# Patient Record
Sex: FEMALE | Race: WHITE | NOT HISPANIC OR LATINO | Employment: STUDENT | URBAN - NONMETROPOLITAN AREA
[De-identification: names, ages, dates, MRNs, and addresses within clinical notes are randomized per-mention and may not be internally consistent; named-entity substitution may affect disease eponyms.]

---
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
## 2025-02-25 ENCOUNTER — OFFICE VISIT (OUTPATIENT)
Age: 20
End: 2025-02-25
Payer: COMMERCIAL

## 2025-02-25 VITALS
TEMPERATURE: 97.6 F | SYSTOLIC BLOOD PRESSURE: 128 MMHG | HEART RATE: 100 BPM | DIASTOLIC BLOOD PRESSURE: 80 MMHG | OXYGEN SATURATION: 98 % | HEIGHT: 67 IN | WEIGHT: 143 LBS | BODY MASS INDEX: 22.44 KG/M2

## 2025-02-25 DIAGNOSIS — N94.10 DYSPAREUNIA IN FEMALE: ICD-10-CM

## 2025-02-25 DIAGNOSIS — F41.9 ANXIETY: Primary | ICD-10-CM

## 2025-02-25 DIAGNOSIS — Z30.41 ENCOUNTER FOR SURVEILLANCE OF CONTRACEPTIVE PILLS: ICD-10-CM

## 2025-02-25 DIAGNOSIS — R00.2 PALPITATION: ICD-10-CM

## 2025-02-25 DIAGNOSIS — F32.4 MAJOR DEPRESSIVE DISORDER WITH SINGLE EPISODE, IN PARTIAL REMISSION: ICD-10-CM

## 2025-02-25 DIAGNOSIS — R10.2 VAGINAL PAIN: ICD-10-CM

## 2025-02-25 DIAGNOSIS — K58.2 IRRITABLE BOWEL SYNDROME WITH BOTH CONSTIPATION AND DIARRHEA: ICD-10-CM

## 2025-02-25 DIAGNOSIS — R00.0 TACHYCARDIA: ICD-10-CM

## 2025-02-25 PROCEDURE — 99214 OFFICE O/P EST MOD 30 MIN: CPT | Performed by: NURSE PRACTITIONER

## 2025-02-25 RX ORDER — SERTRALINE HYDROCHLORIDE 25 MG/1
25 TABLET, FILM COATED ORAL DAILY
Qty: 30 TABLET | Refills: 3 | Status: SHIPPED | OUTPATIENT
Start: 2025-02-25

## 2025-02-25 RX ORDER — NORGESTIMATE AND ETHINYL ESTRADIOL 0.25-0.035
1 KIT ORAL DAILY
Qty: 28 TABLET | Refills: 5 | Status: SHIPPED | OUTPATIENT
Start: 2025-02-25

## 2025-02-25 RX ORDER — AMITRIPTYLINE HYDROCHLORIDE 10 MG/1
10 TABLET ORAL NIGHTLY
Qty: 30 TABLET | Refills: 5 | Status: SHIPPED | OUTPATIENT
Start: 2025-02-25

## 2025-02-25 SDOH — ECONOMIC STABILITY: FOOD INSECURITY: WITHIN THE PAST 12 MONTHS, THE FOOD YOU BOUGHT JUST DIDN'T LAST AND YOU DIDN'T HAVE MONEY TO GET MORE.: NEVER TRUE

## 2025-02-25 SDOH — ECONOMIC STABILITY: FOOD INSECURITY: WITHIN THE PAST 12 MONTHS, YOU WORRIED THAT YOUR FOOD WOULD RUN OUT BEFORE YOU GOT MONEY TO BUY MORE.: NEVER TRUE

## 2025-02-25 ASSESSMENT — PATIENT HEALTH QUESTIONNAIRE - PHQ9
SUM OF ALL RESPONSES TO PHQ QUESTIONS 1-9: 2
SUM OF ALL RESPONSES TO PHQ QUESTIONS 1-9: 2
SUM OF ALL RESPONSES TO PHQ9 QUESTIONS 1 & 2: 2
SUM OF ALL RESPONSES TO PHQ QUESTIONS 1-9: 2
1. LITTLE INTEREST OR PLEASURE IN DOING THINGS: SEVERAL DAYS
2. FEELING DOWN, DEPRESSED OR HOPELESS: SEVERAL DAYS
2. FEELING DOWN, DEPRESSED OR HOPELESS: SEVERAL DAYS
SUM OF ALL RESPONSES TO PHQ9 QUESTIONS 1 & 2: 2
1. LITTLE INTEREST OR PLEASURE IN DOING THINGS: SEVERAL DAYS
SUM OF ALL RESPONSES TO PHQ QUESTIONS 1-9: 2

## 2025-02-25 NOTE — PROGRESS NOTES
Alka Kenney (:  2005) is a 19 y.o. female, Established patient, here for evaluation of the following chief complaint(s):  Anxiety, Gastroesophageal Reflux, Abdominal Pain (Several months.  Stabbing pain.  Comes and goes.), and Sexual Problem         Assessment & Plan  1. Anxiety: The patient's anxiety appears to be exacerbated by her current medication regimen.  - Reduce amitriptyline to 10 mg to manage IBS.  - Initiate Zoloft 25 mg to address anxiety and depression.  - Inform that it may take 3 to 4 weeks for Zoloft to take full effect and symptoms may temporarily worsen during the first week.  - Advise to persist with the medication throughout this period.    2. Depression: The patient reports intermittent depressive symptoms.  - Reduce amitriptyline to 10 mg.  - Add Zoloft 25 mg to better manage depression.  - Monitor symptoms and report any significant changes.    3. Dyspareunia: The patient's dyspareunia could potentially be a side effect of her medication.  - Order a transvaginal ultrasound to rule out any structural abnormalities.  - Inform about the procedure and its purpose.  - Consider referral to an OB-GYN if ultrasound results are unremarkable.  - Conduct a Pap smear in the office if necessary.    4. Neuropathic pain: The patient's symptoms suggest a possible nerve flare-up, which could be attributed to irritation or fibromyalgia.  - Advise to increase water intake to at least 70 to 80 ounces per day.    5. Medication management.  - Provide a refill for birth control. Question if this can be causing her painful intercourse     6. Cardiac issues.  - Arrange a referral to a cardiologist for a second opinion regarding heart issues due to patient request    Follow-up  - Follow-up visit in 1 month to assess the effectiveness of the Zoloft    Results    No results found for this visit on 25.    ICD-10-CM    1. Anxiety  F41.9 amitriptyline (ELAVIL) 10 MG tablet     sertraline (ZOLOFT) 25

## 2025-03-06 ENCOUNTER — TELEPHONE (OUTPATIENT)
Age: 20
End: 2025-03-06

## 2025-03-25 ENCOUNTER — OFFICE VISIT (OUTPATIENT)
Age: 20
End: 2025-03-25
Payer: COMMERCIAL

## 2025-03-25 VITALS
WEIGHT: 143 LBS | BODY MASS INDEX: 22.44 KG/M2 | DIASTOLIC BLOOD PRESSURE: 72 MMHG | HEART RATE: 88 BPM | TEMPERATURE: 97.8 F | OXYGEN SATURATION: 99 % | SYSTOLIC BLOOD PRESSURE: 118 MMHG | HEIGHT: 67 IN

## 2025-03-25 DIAGNOSIS — N94.10 DYSPAREUNIA IN FEMALE: ICD-10-CM

## 2025-03-25 DIAGNOSIS — K21.9 GASTROESOPHAGEAL REFLUX DISEASE WITHOUT ESOPHAGITIS: ICD-10-CM

## 2025-03-25 DIAGNOSIS — Z01.419 WELL WOMAN EXAM WITH ROUTINE GYNECOLOGICAL EXAM: ICD-10-CM

## 2025-03-25 DIAGNOSIS — F41.9 ANXIETY: Primary | ICD-10-CM

## 2025-03-25 DIAGNOSIS — K58.2 IRRITABLE BOWEL SYNDROME WITH BOTH CONSTIPATION AND DIARRHEA: ICD-10-CM

## 2025-03-25 PROCEDURE — 99214 OFFICE O/P EST MOD 30 MIN: CPT | Performed by: NURSE PRACTITIONER

## 2025-03-25 RX ORDER — OMEPRAZOLE 20 MG/1
20 CAPSULE, DELAYED RELEASE ORAL
Qty: 90 CAPSULE | Refills: 3 | Status: SHIPPED | OUTPATIENT
Start: 2025-03-25

## 2025-03-25 ASSESSMENT — ENCOUNTER SYMPTOMS
RESPIRATORY NEGATIVE: 1
EYES NEGATIVE: 1
GASTROINTESTINAL NEGATIVE: 1

## 2025-03-25 NOTE — PROGRESS NOTES
Alka Kenney (:  2005) is a 19 y.o. female, Established patient, here for evaluation of the following chief complaint(s):  Anxiety and Depression         Assessment & Plan  1. Anxiety improved  - Continue current medication regimen due to significant improvement, with only one episode since starting treatment.    2..  Dyspareunia - referral to GYN as she was unable to have transvaginal US due to cost.     3. GERD - improved    Follow-up  - Continue monitoring for any further episodes.    Results    No results found for this visit on 25.    ICD-10-CM    1. Anxiety  F41.9       2. Dyspareunia in female  N94.10 Cecilia Yoo APRN, OB/GYN, [place]      3. Well woman exam with routine gynecological exam  Z01.419 Cecilia Yoo APRN, OB/GYN, [place]      4. Gastroesophageal reflux disease without esophagitis  K21.9 omeprazole (PRILOSEC) 20 MG delayed release capsule      5. Irritable bowel syndrome with both constipation and diarrhea  K58.2          Return in about 4 months (around 2025), or if symptoms worsen or fail to improve, for Follow up chronic conditions.       Subjective   History of Present Illness  The patient presents for evaluation of episodes of anxiety and dyspareunia.    Episodes of anxiety  She reports a significant improvement in her condition, with only one episode occurring since the initiation of her current medication regimen.  - Onset: Since the initiation of her current medication regimen.  - Duration: Only one episode since starting the medication.  - Character: Episodes of an unspecified condition.  - Alleviating Factors: Current medication regimen.  - Severity: Significant improvement with only one episode.    Prior to Visit Medications    Medication Sig Taking? Authorizing Provider   omeprazole (PRILOSEC) 20 MG delayed release capsule Take 1 capsule by mouth every morning (before breakfast) Yes Noemi Resendiz APRN   amitriptyline (ELAVIL) 10 MG

## 2025-03-31 ENCOUNTER — OFFICE VISIT (OUTPATIENT)
Dept: OBGYN CLINIC | Age: 20
End: 2025-03-31
Payer: COMMERCIAL

## 2025-03-31 VITALS
SYSTOLIC BLOOD PRESSURE: 123 MMHG | HEART RATE: 86 BPM | DIASTOLIC BLOOD PRESSURE: 84 MMHG | BODY MASS INDEX: 22.13 KG/M2 | WEIGHT: 141 LBS | HEIGHT: 67 IN

## 2025-03-31 DIAGNOSIS — N94.10 DYSPAREUNIA IN FEMALE: Primary | ICD-10-CM

## 2025-03-31 DIAGNOSIS — N94.6 DYSMENORRHEA: ICD-10-CM

## 2025-03-31 PROCEDURE — 99203 OFFICE O/P NEW LOW 30 MIN: CPT | Performed by: OBSTETRICS & GYNECOLOGY

## 2025-03-31 ASSESSMENT — ENCOUNTER SYMPTOMS
RESPIRATORY NEGATIVE: 1
CONSTIPATION: 1

## 2025-03-31 NOTE — PROGRESS NOTES
SUBJECTIVE:  Alka Kenney is a 19 y.o.  who is here for  follow-up and c/o dyspareunia.      Review of Systems   Constitutional: Negative.    Respiratory: Negative.     Cardiovascular: Negative.    Gastrointestinal:  Positive for constipation.   Genitourinary:  Positive for dyspareunia.        Dysmenorrhea   Musculoskeletal: Negative.    Neurological: Negative.    Psychiatric/Behavioral: Negative.     All other systems reviewed and are negative.      GYN HX:   Patient's last menstrual period was 2025 (approximate).  Abnormal Bleeding/Menses: none  Abnormal pap smear:none    Social History     Substance and Sexual Activity   Sexual Activity Yes    Partners: Male     OB History    No obstetric history on file.         Because violence is so common, we ask all our patients: are you in a relationship or do you live with a person who threatens, hurts, orcontrols you: No    Past Medical History:   Diagnosis Date    Allergic rhinitis     Allergy to alpha-gal     Asthma     dx 2 yrs agoDr Phillip Owens     Past Surgical History:   Procedure Laterality Date    ADENOIDECTOMY      TONSILLECTOMY       Family History   Problem Relation Age of Onset    High Blood Pressure Mother     Other Sister         leaky valve     Social History     Tobacco Use    Smoking status: Never    Smokeless tobacco: Never   Vaping Use    Vaping status: Never Used   Substance Use Topics    Alcohol use: No    Drug use: No     Current Outpatient Medications on File Prior to Visit   Medication Sig Dispense Refill    omeprazole (PRILOSEC) 20 MG delayed release capsule Take 1 capsule by mouth every morning (before breakfast) 90 capsule 3    amitriptyline (ELAVIL) 10 MG tablet Take 1 tablet by mouth nightly 30 tablet 5    sertraline (ZOLOFT) 25 MG tablet Take 1 tablet by mouth daily 30 tablet 3    norgestimate-ethinyl estradiol (ESTARYLLA) 0.25-35 MG-MCG per tablet Take 1 tablet by mouth daily 28 tablet 5    nystatin (MYCOSTATIN) [id number]

## 2025-04-08 ENCOUNTER — LAB (OUTPATIENT)
Dept: LAB | Facility: HOSPITAL | Age: 20
End: 2025-04-08
Payer: COMMERCIAL

## 2025-04-08 ENCOUNTER — RESULTS FOLLOW-UP (OUTPATIENT)
Dept: CARDIOLOGY | Facility: CLINIC | Age: 20
End: 2025-04-08

## 2025-04-08 ENCOUNTER — OFFICE VISIT (OUTPATIENT)
Dept: CARDIOLOGY | Facility: CLINIC | Age: 20
End: 2025-04-08
Payer: COMMERCIAL

## 2025-04-08 VITALS
OXYGEN SATURATION: 98 % | DIASTOLIC BLOOD PRESSURE: 66 MMHG | BODY MASS INDEX: 22.99 KG/M2 | SYSTOLIC BLOOD PRESSURE: 102 MMHG | HEART RATE: 70 BPM | HEIGHT: 65 IN | WEIGHT: 138 LBS

## 2025-04-08 DIAGNOSIS — R55 SYNCOPE AND COLLAPSE: Primary | ICD-10-CM

## 2025-04-08 DIAGNOSIS — R00.2 PALPITATIONS: ICD-10-CM

## 2025-04-08 DIAGNOSIS — R55 SYNCOPE AND COLLAPSE: ICD-10-CM

## 2025-04-08 DIAGNOSIS — R01.1 HEART MURMUR: ICD-10-CM

## 2025-04-08 LAB
ANION GAP SERPL CALCULATED.3IONS-SCNC: 11 MMOL/L (ref 5–15)
BUN SERPL-MCNC: 13 MG/DL (ref 6–20)
BUN/CREAT SERPL: 18.6 (ref 7–25)
CALCIUM SPEC-SCNC: 9.3 MG/DL (ref 8.6–10.5)
CHLORIDE SERPL-SCNC: 102 MMOL/L (ref 98–107)
CO2 SERPL-SCNC: 26 MMOL/L (ref 22–29)
CREAT SERPL-MCNC: 0.7 MG/DL (ref 0.57–1)
EGFRCR SERPLBLD CKD-EPI 2021: 128 ML/MIN/1.73
GLUCOSE SERPL-MCNC: 99 MG/DL (ref 65–99)
MAGNESIUM SERPL-MCNC: 2.2 MG/DL (ref 1.7–2.2)
POTASSIUM SERPL-SCNC: 4.2 MMOL/L (ref 3.5–5.2)
SODIUM SERPL-SCNC: 139 MMOL/L (ref 136–145)
TSH SERPL DL<=0.05 MIU/L-ACNC: 2.6 UIU/ML (ref 0.27–4.2)

## 2025-04-08 PROCEDURE — 36415 COLL VENOUS BLD VENIPUNCTURE: CPT

## 2025-04-08 PROCEDURE — 83735 ASSAY OF MAGNESIUM: CPT

## 2025-04-08 PROCEDURE — 80048 BASIC METABOLIC PNL TOTAL CA: CPT

## 2025-04-08 PROCEDURE — 99204 OFFICE O/P NEW MOD 45 MIN: CPT | Performed by: HOSPITALIST

## 2025-04-08 PROCEDURE — 93000 ELECTROCARDIOGRAM COMPLETE: CPT | Performed by: HOSPITALIST

## 2025-04-08 PROCEDURE — 84443 ASSAY THYROID STIM HORMONE: CPT

## 2025-04-08 RX ORDER — AMITRIPTYLINE HYDROCHLORIDE 10 MG/1
1 TABLET ORAL NIGHTLY
COMMUNITY
Start: 2025-02-25

## 2025-04-08 RX ORDER — OMEPRAZOLE 20 MG/1
20 CAPSULE, DELAYED RELEASE ORAL DAILY
COMMUNITY
Start: 2025-03-25

## 2025-04-08 RX ORDER — SERTRALINE HYDROCHLORIDE 25 MG/1
1 TABLET, FILM COATED ORAL DAILY
COMMUNITY
Start: 2025-02-25

## 2025-04-08 RX ORDER — METOPROLOL SUCCINATE 25 MG/1
1 TABLET, EXTENDED RELEASE ORAL DAILY
COMMUNITY
Start: 2025-03-11

## 2025-04-08 NOTE — PROGRESS NOTES
Reason For Visit:  Tachycardia, palpitations, syncope    Subjective        Trinh Edwards is a 19 y.o. female with the below pertinent PMH who is referred for tachycardia and palpitations.    The patient states that for about 3 years she has been having issues with palpitations, rapid HR, and syncope.  She states that she becomes lightheaded often associated with palpitations with any orthostatic changes.  She also endorses episodes of syncope although states that this typically does not occur with orthostatic changes but rather after she has been standing for a little bit.  When she has syncope she states that she feels lightheaded and then eventually will pass out.  She does state that she typically will feel better if she sits down when she starts to feel lightheaded.  She does not experience chest pain.  She has had some shortness of breath sometimes when her heart rate becomes rapid.  She does mention that all of her symptoms have been progressively worsening in the last couple years.    ROS: Pertinent findings are included above.     Cardiac Studies  Prior echo and cardiac monitor in 2022; I am requesting those results.    Pertinent PMH  Depression/anxiety  Neuropathy  Recurrent syncope    Pertinent past medical, surgical, family, and social history were reviewed and updated in the EMR.      Current Outpatient Medications:     albuterol sulfate  (90 Base) MCG/ACT inhaler, Inhale 2 puffs Every 6 (Six) Hours As Needed for Wheezing or Shortness of Air., Disp: 8 g, Rfl: 0    atomoxetine (STRATTERA) 18 MG capsule, Take 1 capsule by mouth Daily., Disp: , Rfl:     EPINEPHrine (EPIPEN 2-TELLY) 0.3 MG/0.3ML solution auto-injector injection, Use as directed in the event of an anaphylactic reaction, Disp: 1 each, Rfl: 1    ibuprofen (ADVIL,MOTRIN) 200 MG tablet, Take 1 tablet by mouth As Needed for Mild Pain., Disp: , Rfl:     metoprolol tartrate (LOPRESSOR) 25 MG tablet, Take 0.5 tablets by mouth Daily.,  "Disp: , Rfl:     norgestimate-ethinyl estradiol (ORTHO-CYCLEN) 0.25-35 MG-MCG per tablet, Take 1 tablet by mouth Daily., Disp: , Rfl:     vitamin D (ERGOCALCIFEROL) 1.25 MG (12262 UT) capsule capsule, Take 1 capsule by mouth 1 (One) Time Per Week., Disp: , Rfl:      Objective   Vital Signs:  /66   Pulse 70   Ht 165.1 cm (65\")   Wt 62.6 kg (138 lb)   SpO2 98%   BMI 22.96 kg/m²   Estimated body mass index is 22.96 kg/m² as calculated from the following:    Height as of this encounter: 165.1 cm (65\").    Weight as of this encounter: 62.6 kg (138 lb).      Constitutional:       Appearance: Healthy appearance. Not in distress.   Neck:      Vascular: JVD normal.   Pulmonary:      Effort: Pulmonary effort is normal.      Breath sounds: Normal breath sounds.   Cardiovascular:      Normal rate. Regular rhythm.      Murmurs: There is a grade 1/6 systolic murmur.      No gallop.  No click. No rub.   Edema:     Peripheral edema absent.   Abdominal:      General: There is no distension.      Palpations: Abdomen is soft.      Tenderness: There is no abdominal tenderness.   Skin:     General: Skin is warm and dry.   Neurological:      Mental Status: Alert and oriented to person, place and time.        Result Review :             ECG 12 Lead    Date/Time: 4/8/2025 11:18 AM  Performed by: Hugo Braden MD    Authorized by: Hugo Braden MD  Previous ECG: no previous ECG available  Rhythm: sinus arrhythmia  Rate: normal  BPM: 70  Conduction: conduction normal  QRS axis: normal    Clinical impression: normal ECG            Assessment and Plan   Diagnoses and all orders for this visit:    1. Syncope and collapse (Primary)  -     Basic Metabolic Panel; Future  -     Magnesium; Future  -     TSH Rfx On Abnormal To Free T4; Future  -     Holter Monitor - 72 Hour Up To 15 Days; Future  -     Adult Transthoracic Echo Complete w/ Color, Spectral and Contrast if necessary per protocol; Future    2. Palpitations  -     Basic " Metabolic Panel; Future  -     Magnesium; Future  -     TSH Rfx On Abnormal To Free T4; Future  -     Holter Monitor - 72 Hour Up To 15 Days; Future  -     Adult Transthoracic Echo Complete w/ Color, Spectral and Contrast if necessary per protocol; Future    3. Heart murmur  -     Adult Transthoracic Echo Complete w/ Color, Spectral and Contrast if necessary per protocol; Future    Other orders  -     ECG 12 Lead      - Symptoms certainly concerning for autonomic dysfunction.  I am going to obtain an updated monitor and echocardiogram given worsening symptoms.  If these are relatively reassuring, we discussed potentially pursuing a tilt table test.  - Counseled on increase hydration, increase sodium intake, and thigh-high compression stocking use.  - Continue her current Toprol-XL 25 mg daily with plan to reassess at follow-up.    Follow Up   Return in about 8 weeks (around 6/3/2025).  Patient was given instructions and counseling regarding her condition or for health maintenance advice. Please see specific information pulled into the AVS if appropriate.       EMR Dragon/Transcription disclaimer: Much of this encounter note is an electronic transcription/translation of spoken language to printed text. The electronic translation of spoken language may permit erroneous, or at times, nonsensical words or phrases to be inadvertently transcribed; although I have reviewed the note for such errors, some may still exist.

## 2025-04-08 NOTE — TELEPHONE ENCOUNTER
The patient is called and notified of her lab result and she voices understanding.  Leela Nguyen MA

## 2025-04-24 ENCOUNTER — HOSPITAL ENCOUNTER (OUTPATIENT)
Dept: CARDIOLOGY | Facility: HOSPITAL | Age: 20
Discharge: HOME | End: 2025-04-24
Admitting: HOSPITALIST
Payer: COMMERCIAL

## 2025-04-24 VITALS
BODY MASS INDEX: 22.99 KG/M2 | HEIGHT: 65 IN | WEIGHT: 138 LBS | SYSTOLIC BLOOD PRESSURE: 102 MMHG | DIASTOLIC BLOOD PRESSURE: 66 MMHG

## 2025-04-24 DIAGNOSIS — R55 SYNCOPE AND COLLAPSE: ICD-10-CM

## 2025-04-24 DIAGNOSIS — R01.1 HEART MURMUR: ICD-10-CM

## 2025-04-24 DIAGNOSIS — R00.2 PALPITATIONS: ICD-10-CM

## 2025-04-24 PROCEDURE — 25510000001 PERFLUTREN 6.52 MG/ML SUSPENSION: Performed by: HOSPITALIST

## 2025-04-24 PROCEDURE — 93306 TTE W/DOPPLER COMPLETE: CPT

## 2025-04-24 RX ADMIN — PERFLUTREN 6.52 MG: 6.52 INJECTION, SUSPENSION INTRAVENOUS at 10:31

## 2025-04-26 LAB
AORTIC DIMENSIONLESS INDEX: 0.95 (DI)
AV MEAN PRESS GRAD SYS DOP V1V2: 1.74 MMHG
AV VMAX SYS DOP: 87.1 CM/SEC
BH CV ECHO MEAS - AO MAX PG: 3 MMHG
BH CV ECHO MEAS - AO ROOT DIAM: 2.6 CM
BH CV ECHO MEAS - AO V2 VTI: 19.2 CM
BH CV ECHO MEAS - AVA(I,D): 3.2 CM2
BH CV ECHO MEAS - EDV(CUBED): 76.3 ML
BH CV ECHO MEAS - EDV(MOD-SP2): 78.9 ML
BH CV ECHO MEAS - EDV(MOD-SP4): 65.6 ML
BH CV ECHO MEAS - EF(MOD-SP2): 61.4 %
BH CV ECHO MEAS - EF(MOD-SP4): 62.7 %
BH CV ECHO MEAS - ESV(CUBED): 33.5 ML
BH CV ECHO MEAS - ESV(MOD-SP2): 30.5 ML
BH CV ECHO MEAS - ESV(MOD-SP4): 24.4 ML
BH CV ECHO MEAS - FS: 24 %
BH CV ECHO MEAS - IVS/LVPW: 1.17 CM
BH CV ECHO MEAS - IVSD: 0.84 CM
BH CV ECHO MEAS - LA DIMENSION: 2.9 CM
BH CV ECHO MEAS - LAT PEAK E' VEL: 20.7 CM/SEC
BH CV ECHO MEAS - LV DIASTOLIC VOL/BSA (35-75): 38.8 CM2
BH CV ECHO MEAS - LV MASS(C)D: 98.8 GRAMS
BH CV ECHO MEAS - LV MAX PG: 2.9 MMHG
BH CV ECHO MEAS - LV MEAN PG: 1.7 MMHG
BH CV ECHO MEAS - LV SYSTOLIC VOL/BSA (12-30): 14.5 CM2
BH CV ECHO MEAS - LV V1 MAX: 84.3 CM/SEC
BH CV ECHO MEAS - LV V1 VTI: 18.3 CM
BH CV ECHO MEAS - LVIDD: 4.2 CM
BH CV ECHO MEAS - LVIDS: 3.2 CM
BH CV ECHO MEAS - LVOT AREA: 3.4 CM2
BH CV ECHO MEAS - LVOT DIAM: 2.08 CM
BH CV ECHO MEAS - LVPWD: 0.71 CM
BH CV ECHO MEAS - MED PEAK E' VEL: 14.3 CM/SEC
BH CV ECHO MEAS - MV A MAX VEL: 59.3 CM/SEC
BH CV ECHO MEAS - MV DEC SLOPE: 424.1 CM/SEC2
BH CV ECHO MEAS - MV DEC TIME: 0.22 SEC
BH CV ECHO MEAS - MV E MAX VEL: 90.6 CM/SEC
BH CV ECHO MEAS - MV E/A: 1.53
BH CV ECHO MEAS - MV MAX PG: 3.3 MMHG
BH CV ECHO MEAS - MV MEAN PG: 1.41 MMHG
BH CV ECHO MEAS - MV V2 VTI: 23.6 CM
BH CV ECHO MEAS - MVA(VTI): 2.6 CM2
BH CV ECHO MEAS - PA V2 MAX: 101.7 CM/SEC
BH CV ECHO MEAS - PI END-D VEL: 96.4 CM/SEC
BH CV ECHO MEAS - SV(LVOT): 62.4 ML
BH CV ECHO MEAS - SV(MOD-SP2): 48.4 ML
BH CV ECHO MEAS - SV(MOD-SP4): 41.1 ML
BH CV ECHO MEAS - SVI(LVOT): 36.9 ML/M2
BH CV ECHO MEAS - SVI(MOD-SP2): 28.7 ML/M2
BH CV ECHO MEAS - SVI(MOD-SP4): 24.4 ML/M2
BH CV ECHO MEAS - TAPSE (>1.6): 1.87 CM
BH CV ECHO MEASUREMENTS AVERAGE E/E' RATIO: 5.18
LEFT ATRIUM VOLUME INDEX: 14 ML/M2
LEFT ATRIUM VOLUME: 24 ML

## 2025-06-16 ENCOUNTER — OFFICE VISIT (OUTPATIENT)
Dept: CARDIOLOGY | Facility: CLINIC | Age: 20
End: 2025-06-16
Payer: COMMERCIAL

## 2025-06-16 VITALS
OXYGEN SATURATION: 99 % | HEIGHT: 65 IN | WEIGHT: 140 LBS | SYSTOLIC BLOOD PRESSURE: 98 MMHG | BODY MASS INDEX: 23.32 KG/M2 | HEART RATE: 71 BPM | DIASTOLIC BLOOD PRESSURE: 64 MMHG

## 2025-06-16 DIAGNOSIS — R00.2 PALPITATIONS: ICD-10-CM

## 2025-06-16 DIAGNOSIS — R55 SYNCOPE AND COLLAPSE: ICD-10-CM

## 2025-06-16 DIAGNOSIS — G90.9 AUTONOMIC DYSFUNCTION: Primary | ICD-10-CM

## 2025-06-16 PROCEDURE — 93000 ELECTROCARDIOGRAM COMPLETE: CPT | Performed by: HOSPITALIST

## 2025-06-16 PROCEDURE — 99213 OFFICE O/P EST LOW 20 MIN: CPT | Performed by: HOSPITALIST

## 2025-06-16 NOTE — PROGRESS NOTES
Reason For Visit:  Autonomic dysfunction, recurrent syncope, palpitations    Subjective        Trinh Edwards is a 20 y.o. female with the below pertinent PMH who presents for follow-up of the above issues.    Trinh Edwards was most recently seen by me in clinic 4/8/2025 for initial evaluation of palpitations, tachycardia, and syncope.  Symptoms were concerning for autonomic dysfunction.  She was counseled on conservative measures along with obtaining an echo and a cardiac monitor.  She was continued on Toprol-XL 25 mg daily that she had been on previously.     Today, the patient reports that she has done a little better since her last visit as she has not had any recurrent episodes of syncope.  That being said, she does continue to have palpitations and feels like she gets tunnel vision whenever she stands up.  She has noticed that if she lets her legs hanging down for a little bit before she stands up she does do a little bit better.  She also feels like she still has episodes of palpitations with her heart racing especially after getting up, but this is still overall improved with her metoprolol use.  No other reported cardiac symptoms.  She mentions that when she is doing well she drinks up to 60 ounces of water daily, but often she drinks more in the 32-48 ounce range.    ROS: Pertinent findings are included above.    Cardiac Studies  Echo 4/26/25: LVEF 56-60%, normal diastolic function, normal RV size/function, normal biatrial size, there is no significant valvular dysfunction.  Cardiac monitor 4/29/25: Relatively benign. Predominantly sinus rhythm with average rate 80 () BPM.  There were no significant arrhythmias, frequent ectopy, high-grade AV block, or pauses detected. Symptomatic strips demonstrated sinus rhythm with some including sinus tachycardia and some including marked sinus arrhythmias (possibly indicative of increased vagal tone), but these findings did not correlate  "consistently.      Pertinent PMH  Depression/anxiety  Neuropathy  Recurrent syncope  Autonomic dysfunction    Pertinent past medical, surgical, family, and social history were reviewed.      Current Outpatient Medications:     albuterol sulfate  (90 Base) MCG/ACT inhaler, Inhale 2 puffs Every 6 (Six) Hours As Needed for Wheezing or Shortness of Air. (Patient not taking: Reported on 4/8/2025), Disp: 8 g, Rfl: 0    amitriptyline (ELAVIL) 10 MG tablet, Take 1 tablet by mouth Every Night., Disp: , Rfl:     Cyanocobalamin (VITAMIN B 12 PO), Take  by mouth., Disp: , Rfl:     EPINEPHrine (EPIPEN 2-TELLY) 0.3 MG/0.3ML solution auto-injector injection, Use as directed in the event of an anaphylactic reaction, Disp: 1 each, Rfl: 1    ibuprofen (ADVIL,MOTRIN) 200 MG tablet, Take 1 tablet by mouth As Needed for Mild Pain., Disp: , Rfl:     metoprolol succinate XL (TOPROL-XL) 25 MG 24 hr tablet, Take 1 tablet by mouth Daily., Disp: , Rfl:     norgestimate-ethinyl estradiol (ORTHO-CYCLEN) 0.25-35 MG-MCG per tablet, Take 1 tablet by mouth Daily., Disp: , Rfl:     omeprazole (priLOSEC) 20 MG capsule, Take 1 capsule by mouth Daily., Disp: , Rfl:     sertraline (ZOLOFT) 25 MG tablet, Take 1 tablet by mouth Daily., Disp: , Rfl:     vitamin D (ERGOCALCIFEROL) 1.25 MG (74856 UT) capsule capsule, Take 1 capsule by mouth 1 (One) Time Per Week., Disp: , Rfl:      Objective   Vital Signs:  BP 98/64   Pulse 71   Ht 165.1 cm (65\")   Wt 63.5 kg (140 lb)   SpO2 99%   BMI 23.30 kg/m²   Estimated body mass index is 23.3 kg/m² as calculated from the following:    Height as of this encounter: 165.1 cm (65\").    Weight as of this encounter: 63.5 kg (140 lb).      Constitutional:       Appearance: Healthy appearance. Not in distress.   Neck:      Vascular: JVD normal.   Pulmonary:      Effort: Pulmonary effort is normal.      Breath sounds: Normal breath sounds.   Cardiovascular:      Normal rate. Regular rhythm.      Murmurs: There is no " murmur.      No gallop.  No click. No rub.   Edema:     Peripheral edema absent.   Abdominal:      General: There is no distension.      Palpations: Abdomen is soft.      Tenderness: There is no abdominal tenderness.   Skin:     General: Skin is warm and dry.   Neurological:      Mental Status: Alert and oriented to person, place and time.        Result Review :             ECG 12 Lead    Date/Time: 6/16/2025 1:50 PM  Performed by: Hugo Braden MD    Authorized by: Hugo Braden MD  Comparison: compared with previous ECG from 4/8/2025  Similar to previous ECG  Rhythm: sinus arrhythmia  Rate: normal  BPM: 71  Conduction: conduction normal    Clinical impression: normal ECG            Assessment and Plan   Diagnoses and all orders for this visit:    1. Autonomic dysfunction (Primary)  2. Syncope and collapse  3. Palpitations  -     Tilt Table; Future    -Some improvement as she has not had any recent syncope.  The having said, still with orthostatic symptoms.  Clinical picture indicative of likely autonomic dysfunction possibly POTS.  - Counseled on further increasing fluid intake as she is still not drinking enough water.  She has not taken any significant measures to improve sodium intake, so we discussed measures to try to help with this.  - Obtaining a tilt table test.  - Continue Toprol-XL 25 mg daily for now.    Follow Up   Return in about 4 months (around 10/16/2025).  Patient was given instructions and counseling regarding her condition or for health maintenance advice. Please see specific information pulled into the AVS if appropriate.       Part of this note may be an electronic transcription/translation of spoken language to printed text using the Dragon Dictation System.

## 2025-06-22 DIAGNOSIS — F41.9 ANXIETY: ICD-10-CM

## 2025-06-22 DIAGNOSIS — F32.4 MAJOR DEPRESSIVE DISORDER WITH SINGLE EPISODE, IN PARTIAL REMISSION: ICD-10-CM

## 2025-06-23 NOTE — TELEPHONE ENCOUNTER
Alka called requesting a refill of the below medication which has been pended for you:     Requested Prescriptions     Pending Prescriptions Disp Refills    sertraline (ZOLOFT) 25 MG tablet [Pharmacy Med Name: SERTRALINE 25MG TABLETS] 30 tablet 3     Sig: TAKE 1 TABLET BY MOUTH DAILY       Last Appointment Date: 3/25/2025  Next Appointment Date: 7/23/2025    Allergies   Allergen Reactions    Cefdinir Diarrhea     GI Problems    Beef-Derived Drug Products     Clarithromycin Nausea And Vomiting    Penicillins Rash

## 2025-06-24 ENCOUNTER — HOSPITAL ENCOUNTER (OUTPATIENT)
Dept: CARDIOLOGY | Facility: HOSPITAL | Age: 20
Discharge: HOME | End: 2025-06-24
Admitting: HOSPITALIST
Payer: COMMERCIAL

## 2025-06-24 DIAGNOSIS — G90.9 AUTONOMIC DYSFUNCTION: ICD-10-CM

## 2025-06-24 DIAGNOSIS — R55 SYNCOPE AND COLLAPSE: ICD-10-CM

## 2025-06-24 DIAGNOSIS — R00.2 PALPITATIONS: ICD-10-CM

## 2025-06-24 PROCEDURE — 93660 TILT TABLE EVALUATION: CPT

## 2025-06-24 RX ORDER — NITROGLYCERIN 0.4 MG/1
0.4 TABLET SUBLINGUAL ONCE
Status: DISCONTINUED | OUTPATIENT
Start: 2025-06-24 | End: 2025-06-25 | Stop reason: HOSPADM

## 2025-06-24 RX ORDER — SERTRALINE HYDROCHLORIDE 25 MG/1
25 TABLET, FILM COATED ORAL DAILY
Qty: 30 TABLET | Refills: 3 | Status: SHIPPED | OUTPATIENT
Start: 2025-06-24

## 2025-07-23 ENCOUNTER — OFFICE VISIT (OUTPATIENT)
Age: 20
End: 2025-07-23
Payer: COMMERCIAL

## 2025-07-23 VITALS
TEMPERATURE: 97.5 F | HEART RATE: 84 BPM | DIASTOLIC BLOOD PRESSURE: 80 MMHG | HEIGHT: 67 IN | WEIGHT: 138 LBS | SYSTOLIC BLOOD PRESSURE: 118 MMHG | OXYGEN SATURATION: 98 % | BODY MASS INDEX: 21.66 KG/M2

## 2025-07-23 DIAGNOSIS — F41.9 ANXIETY: ICD-10-CM

## 2025-07-23 DIAGNOSIS — G90.A POTS (POSTURAL ORTHOSTATIC TACHYCARDIA SYNDROME): ICD-10-CM

## 2025-07-23 DIAGNOSIS — K58.2 IRRITABLE BOWEL SYNDROME WITH BOTH CONSTIPATION AND DIARRHEA: Primary | ICD-10-CM

## 2025-07-23 PROCEDURE — 99214 OFFICE O/P EST MOD 30 MIN: CPT | Performed by: NURSE PRACTITIONER

## 2025-07-23 RX ORDER — MIDODRINE HYDROCHLORIDE 2.5 MG/1
2.5 TABLET ORAL 3 TIMES DAILY
COMMUNITY

## 2025-07-23 RX ORDER — AMITRIPTYLINE HYDROCHLORIDE 10 MG/1
10 TABLET ORAL NIGHTLY
Qty: 90 TABLET | Refills: 3 | Status: SHIPPED | OUTPATIENT
Start: 2025-07-23

## 2025-07-23 ASSESSMENT — ENCOUNTER SYMPTOMS
GASTROINTESTINAL NEGATIVE: 1
RESPIRATORY NEGATIVE: 1
EYES NEGATIVE: 1

## 2025-07-23 NOTE — PROGRESS NOTES
Alka Kenney (:  2005) is a 20 y.o. female, Established patient, here for evaluation of the following chief complaint(s):  Anxiety (Stable with current medication regimen ) and IBS (Stable )         Assessment & Plan  1. Postural Orthostatic Tachycardia Syndrome (POTS): Chronic. Tilt table test was positive for neurocardiogenic syncope, with blood pressure dropping from 105/78 to 69/58.  - Take prescribed medication three times a day before meals.  - Set alarms or reminders to help remember taking the medication.  - Ensure adequate hydration.  - Increase salt intake.  - Use compression stockings to prevent blood pressure drops and manage potential swelling due to increased sodium intake.    2. Irritable Bowel Syndrome (IBS): Chronic. Reports increased IBS-type symptoms, including diarrhea. Symptoms were present before starting the current medication.  - Continue current dose of amitriptyline.  - If symptoms worsen, adjust dosage of amitriptyline to 15 mg instead of going up to 20 or 25 mg.  - A year's supply of the 10 mg dose of amitriptyline has been called into the pharmacy.    Follow-up  - Follow up in 6 months for an annual checkup.    Results  - Diagnostic Testing:    - Tilt table test: Positive for neurocardiogenic syncope  No results found for this visit on 25.    ICD-10-CM    1. Irritable bowel syndrome with both constipation and diarrhea  K58.2 amitriptyline (ELAVIL) 10 MG tablet      2. Anxiety  F41.9 amitriptyline (ELAVIL) 10 MG tablet         Return in about 6 months (around 2026) for Annual Physical Exam.       Subjective   History of Present Illness  The patient presents for a follow-up visit to ensure her anxiety and other conditions are well-managed.    Cardiologist-Prescribed Medication and POTS  She reports that her cardiologist has prescribed a medication to be taken three times daily before meals. However, she is finding it challenging to adhere to this regimen. She is

## 2025-08-05 RX ORDER — ERGOCALCIFEROL 1.25 MG/1
50000 CAPSULE, LIQUID FILLED ORAL WEEKLY
Qty: 4 CAPSULE | Refills: 11 | Status: SHIPPED | OUTPATIENT
Start: 2025-08-05

## 2025-08-15 DIAGNOSIS — N94.10 DYSPAREUNIA IN FEMALE: Primary | ICD-10-CM

## 2025-08-23 DIAGNOSIS — Z30.41 ENCOUNTER FOR SURVEILLANCE OF CONTRACEPTIVE PILLS: ICD-10-CM

## 2025-08-26 ENCOUNTER — OFFICE VISIT (OUTPATIENT)
Age: 20
End: 2025-08-26

## 2025-08-26 VITALS
HEART RATE: 124 BPM | OXYGEN SATURATION: 97 % | TEMPERATURE: 98.2 F | RESPIRATION RATE: 22 BRPM | DIASTOLIC BLOOD PRESSURE: 82 MMHG | SYSTOLIC BLOOD PRESSURE: 104 MMHG | WEIGHT: 135.4 LBS | BODY MASS INDEX: 21.76 KG/M2 | HEIGHT: 66 IN

## 2025-08-26 DIAGNOSIS — R11.2 NAUSEA AND VOMITING, UNSPECIFIED VOMITING TYPE: Primary | ICD-10-CM

## 2025-08-26 RX ORDER — ONDANSETRON 4 MG/1
4 TABLET, ORALLY DISINTEGRATING ORAL EVERY 8 HOURS PRN
Qty: 15 TABLET | Refills: 0 | Status: SHIPPED | OUTPATIENT
Start: 2025-08-26

## 2025-08-26 RX ORDER — ONDANSETRON 4 MG/1
4 TABLET, ORALLY DISINTEGRATING ORAL ONCE
Status: COMPLETED | OUTPATIENT
Start: 2025-08-26 | End: 2025-08-26

## 2025-08-26 RX ORDER — NORGESTIMATE AND ETHINYL ESTRADIOL 0.25-0.035
1 KIT ORAL DAILY
Qty: 28 TABLET | Refills: 5 | Status: SHIPPED | OUTPATIENT
Start: 2025-08-26

## 2025-08-26 RX ADMIN — ONDANSETRON 4 MG: 4 TABLET, ORALLY DISINTEGRATING ORAL at 09:38

## 2025-08-26 ASSESSMENT — ENCOUNTER SYMPTOMS
COUGH: 0
VOMITING: 1
EYES NEGATIVE: 1
NAUSEA: 1
DIARRHEA: 0
ABDOMINAL PAIN: 1

## (undated) DEVICE — GLV SURG BIOGEL M LTX PF 7 1/2

## (undated) DEVICE — SUCTION COAGULATOR, 1 PER POUCH: Brand: A&E MEDICAL / DISPOSABLE SUCTION COAGULATOR

## (undated) DEVICE — CATHETER,URETHRAL,REDRUBBER,STERILE,8FR: Brand: MEDLINE

## (undated) DEVICE — PAD T & A: Brand: MEDLINE INDUSTRIES, INC.

## (undated) DEVICE — EVAC 70 XTRA HP WAND: Brand: COBLATION